# Patient Record
Sex: MALE | Race: WHITE | NOT HISPANIC OR LATINO | Employment: UNEMPLOYED | URBAN - METROPOLITAN AREA
[De-identification: names, ages, dates, MRNs, and addresses within clinical notes are randomized per-mention and may not be internally consistent; named-entity substitution may affect disease eponyms.]

---

## 2022-11-04 ENCOUNTER — HOSPITAL ENCOUNTER (EMERGENCY)
Facility: HOSPITAL | Age: 27
Discharge: HOME | End: 2022-11-04
Attending: EMERGENCY MEDICINE

## 2022-11-04 ENCOUNTER — APPOINTMENT (EMERGENCY)
Dept: CT IMAGING | Facility: HOSPITAL | Age: 27
End: 2022-11-04

## 2022-11-04 VITALS
BODY MASS INDEX: 36.54 KG/M2 | HEART RATE: 91 BPM | WEIGHT: 214 LBS | OXYGEN SATURATION: 96 % | HEIGHT: 64 IN | RESPIRATION RATE: 19 BRPM | TEMPERATURE: 98 F | DIASTOLIC BLOOD PRESSURE: 77 MMHG | SYSTOLIC BLOOD PRESSURE: 140 MMHG

## 2022-11-04 DIAGNOSIS — R51.9 RECURRENT HEADACHE: Primary | ICD-10-CM

## 2022-11-04 DIAGNOSIS — H53.8 BLURRY VISION, LEFT EYE: ICD-10-CM

## 2022-11-04 LAB
ALBUMIN SERPL BCP-MCNC: 3.8 G/DL (ref 3.5–5)
ALP SERPL-CCNC: 85 U/L (ref 46–116)
ALT SERPL W P-5'-P-CCNC: 89 U/L (ref 12–78)
ANION GAP SERPL CALCULATED.3IONS-SCNC: 6 MMOL/L (ref 4–13)
AST SERPL W P-5'-P-CCNC: 37 U/L (ref 5–45)
ATRIAL RATE: 100 BPM
BASOPHILS # BLD AUTO: 0.04 THOUSANDS/ÂΜL (ref 0–0.1)
BASOPHILS NFR BLD AUTO: 1 % (ref 0–1)
BILIRUB SERPL-MCNC: 0.2 MG/DL (ref 0.2–1)
BUN SERPL-MCNC: 20 MG/DL (ref 5–25)
CALCIUM SERPL-MCNC: 8.8 MG/DL (ref 8.3–10.1)
CARDIAC TROPONIN I PNL SERPL HS: 2 NG/L
CHLORIDE SERPL-SCNC: 106 MMOL/L (ref 96–108)
CO2 SERPL-SCNC: 28 MMOL/L (ref 21–32)
CREAT SERPL-MCNC: 1.03 MG/DL (ref 0.6–1.3)
EOSINOPHIL # BLD AUTO: 0.14 THOUSAND/ÂΜL (ref 0–0.61)
EOSINOPHIL NFR BLD AUTO: 2 % (ref 0–6)
ERYTHROCYTE [DISTWIDTH] IN BLOOD BY AUTOMATED COUNT: 11.2 % (ref 11.6–15.1)
GFR SERPL CREATININE-BSD FRML MDRD: 101 ML/MIN/1.73SQ M
GLUCOSE SERPL-MCNC: 106 MG/DL (ref 65–140)
HCT VFR BLD AUTO: 43.1 % (ref 36.5–49.3)
HGB BLD-MCNC: 14.5 G/DL (ref 12–17)
IMM GRANULOCYTES # BLD AUTO: 0.02 THOUSAND/UL (ref 0–0.2)
IMM GRANULOCYTES NFR BLD AUTO: 0 % (ref 0–2)
LYMPHOCYTES # BLD AUTO: 2.09 THOUSANDS/ÂΜL (ref 0.6–4.47)
LYMPHOCYTES NFR BLD AUTO: 33 % (ref 14–44)
MCH RBC QN AUTO: 28.4 PG (ref 26.8–34.3)
MCHC RBC AUTO-ENTMCNC: 33.6 G/DL (ref 31.4–37.4)
MCV RBC AUTO: 85 FL (ref 82–98)
MONOCYTES # BLD AUTO: 0.68 THOUSAND/ÂΜL (ref 0.17–1.22)
MONOCYTES NFR BLD AUTO: 11 % (ref 4–12)
NEUTROPHILS # BLD AUTO: 3.37 THOUSANDS/ÂΜL (ref 1.85–7.62)
NEUTS SEG NFR BLD AUTO: 53 % (ref 43–75)
NRBC BLD AUTO-RTO: 0 /100 WBCS
P AXIS: 48 DEGREES
PLATELET # BLD AUTO: 268 THOUSANDS/UL (ref 149–390)
PMV BLD AUTO: 9.2 FL (ref 8.9–12.7)
POTASSIUM SERPL-SCNC: 3.9 MMOL/L (ref 3.5–5.3)
PR INTERVAL: 166 MS
PROT SERPL-MCNC: 7.8 G/DL (ref 6.4–8.4)
QRS AXIS: 85 DEGREES
QRSD INTERVAL: 94 MS
QT INTERVAL: 338 MS
QTC INTERVAL: 436 MS
RBC # BLD AUTO: 5.1 MILLION/UL (ref 3.88–5.62)
SODIUM SERPL-SCNC: 140 MMOL/L (ref 135–147)
T WAVE AXIS: 26 DEGREES
VENTRICULAR RATE: 100 BPM
WBC # BLD AUTO: 6.34 THOUSAND/UL (ref 4.31–10.16)

## 2022-11-04 RX ORDER — KETOROLAC TROMETHAMINE 30 MG/ML
15 INJECTION, SOLUTION INTRAMUSCULAR; INTRAVENOUS ONCE
Status: COMPLETED | OUTPATIENT
Start: 2022-11-04 | End: 2022-11-04

## 2022-11-04 RX ORDER — DIPHENHYDRAMINE HYDROCHLORIDE 50 MG/ML
25 INJECTION INTRAMUSCULAR; INTRAVENOUS ONCE
Status: COMPLETED | OUTPATIENT
Start: 2022-11-04 | End: 2022-11-04

## 2022-11-04 RX ORDER — TETRACAINE HYDROCHLORIDE 5 MG/ML
1 SOLUTION OPHTHALMIC ONCE
Status: COMPLETED | OUTPATIENT
Start: 2022-11-04 | End: 2022-11-04

## 2022-11-04 RX ORDER — MAGNESIUM SULFATE HEPTAHYDRATE 40 MG/ML
2 INJECTION, SOLUTION INTRAVENOUS ONCE
Status: COMPLETED | OUTPATIENT
Start: 2022-11-04 | End: 2022-11-04

## 2022-11-04 RX ADMIN — MAGNESIUM SULFATE HEPTAHYDRATE 2 G: 2 INJECTION, SOLUTION INTRAVENOUS at 17:05

## 2022-11-04 RX ADMIN — DIPHENHYDRAMINE HYDROCHLORIDE 25 MG: 50 INJECTION, SOLUTION INTRAMUSCULAR; INTRAVENOUS at 16:55

## 2022-11-04 RX ADMIN — SODIUM CHLORIDE 1000 ML: 0.9 INJECTION, SOLUTION INTRAVENOUS at 16:54

## 2022-11-04 RX ADMIN — KETOROLAC TROMETHAMINE 15 MG: 30 INJECTION, SOLUTION INTRAMUSCULAR; INTRAVENOUS at 16:56

## 2022-11-04 RX ADMIN — IOHEXOL 100 ML: 350 INJECTION, SOLUTION INTRAVENOUS at 18:39

## 2022-11-04 RX ADMIN — TETRACAINE HYDROCHLORIDE 1 DROP: 5 SOLUTION OPHTHALMIC at 19:52

## 2022-11-04 NOTE — ED PROVIDER NOTES
History  Chief Complaint   Patient presents with   • Headache     Pt reports a headache that he has been experiencing for a couple years now  States he feels like his forehead is swollen, and thinks he has "like high blood pressure too"      27year old male presents for evaluation of daily headaches for the past 2 years  Patient states the headaches are pressure-like over the left eye, typically lasting 1 hour at a time  Patient states he began having a headache at 1 pm which was associated with blurring of the vision from his left eye and chest tightness  Symptoms lasted approximately 1 hour and then resolved without intervention  He states his blood pressure was elevated to 186/98  Patient states the headache returned 30 minutes ago, moderate in intensity  No chest pain or shortness of breath with this episode  No recent illness  Patient has history of IVDU in the past, but states he has not used any drugs in the past 6 months  History provided by:  Patient  Headache  Pain location:  Frontal  Radiates to:  Does not radiate  Pain severity now: moderate  Onset quality:  Gradual  Duration: current episode 30 minutes, recurrent for 2 years  Timing:  Intermittent  Chronicity:  Recurrent  Similar to prior headaches: yes    Relieved by:  None tried  Worsened by:  Nothing  Ineffective treatments:  None tried  Associated symptoms: no congestion, no cough, no diarrhea, no eye pain, no fever, no nausea, no seizures, no sore throat and no vomiting        None       History reviewed  No pertinent past medical history  History reviewed  No pertinent surgical history  History reviewed  No pertinent family history  I have reviewed and agree with the history as documented      E-Cigarette/Vaping     E-Cigarette/Vaping Substances     Social History     Tobacco Use   • Smoking status: Never Smoker   • Smokeless tobacco: Never Used   Substance Use Topics   • Alcohol use: Never   • Drug use: Never       Review of Systems Constitutional: Negative for chills and fever  HENT: Negative for congestion and sore throat  Eyes: Positive for visual disturbance  Negative for pain  Respiratory: Positive for chest tightness  Negative for cough and shortness of breath  Cardiovascular: Positive for chest pain  Gastrointestinal: Negative for diarrhea, nausea and vomiting  Skin: Negative for rash and wound  Neurological: Positive for headaches  Negative for seizures and syncope  All other systems reviewed and are negative  Physical Exam  Physical Exam  Vitals and nursing note reviewed  Constitutional:       General: He is not in acute distress  Appearance: He is well-developed  He is not toxic-appearing or diaphoretic  HENT:      Head: Normocephalic and atraumatic  Right Ear: External ear normal       Left Ear: External ear normal       Nose: Nose normal    Eyes:      General: No scleral icterus  Intraocular pressure: Right eye pressure is 12 mmHg  Left eye pressure is 12 mmHg  Measurements were taken using a handheld tonometer  Extraocular Movements: Extraocular movements intact  Pupils: Pupils are equal, round, and reactive to light  Cardiovascular:      Rate and Rhythm: Normal rate and regular rhythm  Heart sounds: Normal heart sounds  Pulmonary:      Effort: Pulmonary effort is normal  No respiratory distress  Breath sounds: Normal breath sounds  Abdominal:      General: There is no distension  Musculoskeletal:         General: No deformity  Normal range of motion  Skin:     Findings: No rash  Neurological:      General: No focal deficit present  Mental Status: He is alert and oriented to person, place, and time  Cranial Nerves: Cranial nerves are intact  Sensory: Sensation is intact  Motor: Motor function is intact  Coordination: Coordination is intact   Finger-Nose-Finger Test and Heel to [facility] [[email or web address]] [place] Test normal       Gait: Gait normal    Psychiatric: Mood and Affect: Mood normal          Vital Signs  ED Triage Vitals   Temperature Pulse Respirations Blood Pressure SpO2   11/04/22 1552 11/04/22 1552 11/04/22 1552 11/04/22 1554 11/04/22 1552   98 °F (36 7 °C) 103 20 154/92 98 %      Temp Source Heart Rate Source Patient Position - Orthostatic VS BP Location FiO2 (%)   11/04/22 1552 11/04/22 1552 -- -- --   Temporal Monitor         Pain Score       11/04/22 1656       7           Vitals:    11/04/22 1552 11/04/22 1554 11/04/22 1800   BP:  154/92 122/64   Pulse: 103  91         Visual Acuity  Visual Acuity    Flowsheet Row Most Recent Value   Visual acuity R eye is 20/20   Visual acuity Left eye is 20/50   Visual acuity in both eyes is 20/25   Wearing corrective eyewear/lenses?  No          ED Medications  Medications   magnesium sulfate 2 g/50 mL IVPB (premix) 2 g (0 g Intravenous Stopped 11/4/22 1950)   ketorolac (TORADOL) injection 15 mg (15 mg Intravenous Given 11/4/22 1656)   diphenhydrAMINE (BENADRYL) injection 25 mg (25 mg Intravenous Given 11/4/22 1655)   sodium chloride 0 9 % bolus 1,000 mL (0 mL Intravenous Stopped 11/4/22 1950)   iohexol (OMNIPAQUE) 350 MG/ML injection (SINGLE-DOSE) 100 mL (100 mL Intravenous Given 11/4/22 1839)   tetracaine 0 5 % ophthalmic solution 1 drop (1 drop Both Eyes Given 11/4/22 1952)       Diagnostic Studies  Results Reviewed     Procedure Component Value Units Date/Time    HS Troponin 0hr (reflex protocol) [[id number]]  (Normal) Collected: 11/04/22 1701    Lab Status: Final result Specimen: Blood from Arm, Right Updated: 11/04/22 1731     hs TnI 0hr 2 ng/L     Comprehensive metabolic panel [[id number]]  (Abnormal) Collected: 11/04/22 1701    Lab Status: Final result Specimen: Blood from Arm, Right Updated: 11/04/22 1725     Sodium 140 mmol/L      Potassium 3 9 mmol/L      Chloride 106 mmol/L      CO2 28 mmol/L      ANION GAP 6 mmol/L      BUN 20 mg/dL      Creatinine 1 03 mg/dL      Glucose 106 mg/dL      Calcium 8 8 mg/dL AST 37 U/L      ALT 89 U/L      Alkaline Phosphatase 85 U/L      Total Protein 7 8 g/dL      Albumin 3 8 g/dL      Total Bilirubin 0 20 mg/dL      eGFR 101 ml/min/1 73sq m     Narrative:      [place] guidelines for Chronic Kidney Disease (CKD):   •  Stage 1 with normal or high GFR (GFR > 90 mL/min/1 73 square meters)  •  Stage 2 Mild CKD (GFR = 60-89 mL/min/1 73 square meters)  •  Stage 3A Moderate CKD (GFR = 45-59 mL/min/1 73 square meters)  •  Stage 3B Moderate CKD (GFR = 30-44 mL/min/1 73 square meters)  •  Stage 4 Severe CKD (GFR = 15-29 mL/min/1 73 square meters)  •  Stage 5 End Stage CKD (GFR <15 mL/min/1 73 square meters)  Note: GFR calculation is accurate only with a steady state creatinine    CBC and differential [[id number]]  (Abnormal) Collected: 11/04/22 1701    Lab Status: Final result Specimen: Blood from Arm, Right Updated: 11/04/22 1710     WBC 6 34 Thousand/uL      RBC 5 10 Million/uL      Hemoglobin 14 5 g/dL      Hematocrit 43 1 %      MCV 85 fL      MCH 28 4 pg      MCHC 33 6 g/dL      RDW 11 2 %      MPV 9 2 fL      Platelets 209 Thousands/uL      nRBC 0 /100 WBCs      Neutrophils Relative 53 %      Immat GRANS % 0 %      Lymphocytes Relative 33 %      Monocytes Relative 11 %      Eosinophils Relative 2 %      Basophils Relative 1 %      Neutrophils Absolute 3 37 Thousands/µL      Immature Grans Absolute 0 02 Thousand/uL      Lymphocytes Absolute 2 09 Thousands/µL      Monocytes Absolute 0 68 Thousand/µL      Eosinophils Absolute 0 14 Thousand/µL      Basophils Absolute 0 04 Thousands/µL                  CTA head and neck with and without contrast   Final Result by Blessing eLzama MD (11/04 1916)         1  No evidence of acute intracranial hemorrhage  2  No evidence of hemodynamic significant stenosis, aneurysm or dissection                    Workstation performed: [id number]                    Procedures  ECG 12 Lead Documentation Only    Date/Time: 11/4/2022 5:20 PM  Performed by: Leona Kay MD  Authorized by: Leona Kay MD     Indications / Diagnosis:  Headache  ECG reviewed by me, the ED Provider: yes    Patient location:  ED  Previous ECG:     Previous ECG:  Unavailable  Interpretation:     Interpretation: normal    Rate:     ECG rate:  100    ECG rate assessment: tachycardic    Rhythm:     Rhythm: sinus tachycardia    Ectopy:     Ectopy: none    QRS:     QRS axis:  Normal    QRS intervals:  Normal  Conduction:     Conduction: normal    ST segments:     ST segments:  Normal  T waves:     T waves: inverted      Inverted:  III    POC Ocular US    Date/Time: 11/4/2022 8:00 PM  Performed by: Leona Kay MD  Authorized by: Leona Kay MD     Patient location:  ED  Performed by: Attending  Procedure details:     Exam Type:  Diagnostic    Indications: visual change      Assessment for: retinal detachment and optic nerve sheath diameter      Eye(s) assessed:  Left eye    Structures visualized: anterior chamber, posterior chamber, lens and optic nerve      Image quality: limited diagnostic      Image availability:  Images available in PACS  Left eye findings:     Retinal contour: normal      Vitreous body: anechoic      Left optic nerve sheath diameter: normal (less than or equal to 5 mm)      Left optic nerve sheath diameter (mm):  4  Interpretation:     Ocular US impressions: normal               ED Course                               SBIRT 20yo+    Flowsheet Row Most Recent Value   SBIRT (23 yo +)    In order to provide better care to our patients, we are screening all of our patients for alcohol and drug use  Would it be okay to ask you these screening questions?  No Filed at: 11/04/2022 1652                    MDM  Number of Diagnoses or Management Options  Blurry vision, left eye: new and requires workup  Recurrent headache: new and requires workup  Diagnosis management comments: 27year old male presents for evaluation of recurrent headache for 2 years  No prior workup per patient  Labs unremarkable  CTA head/neck unremarkable  Normal occular pressure  Retina unremarkable on bedside ultrasound  Symptoms improved with migraine cocktail  Neuro and ophthalmology follow up  Return precautions provided  Amount and/or Complexity of Data Reviewed  Clinical lab tests: ordered and reviewed  Tests in the radiology section of CPT®: ordered and reviewed    Patient Progress  Patient progress: stable      Disposition  Final diagnoses:   Blurry vision, left eye   Recurrent headache     Time reflects when diagnosis was documented in both MDM as applicable and the Disposition within this note     Time User Action Codes Description Comment    11/4/2022  7:59 PM Marnee Elm Add [H53 8] Blurry vision, left eye     11/4/2022  8:00 PM Marnee Elm Add [R51 9] Acute headache     11/4/2022  8:00 PM Hope, Clovia Durie Add [R51 9] Recurrent headache     11/4/2022  8:00 PM Hope, Clovia Durie Remove [R51 9] Acute headache     11/4/2022  8:00 PM Marnee Elm Modify [H53 8] Blurry vision, left eye     11/4/2022  8:00 PM Marnee Elm Modify [R51 9] Recurrent headache       ED Disposition     ED Disposition   Discharge    Condition   Stable    Date/Time   Fri Nov 4, 2022  8:00 PM    Comment   Francisco Renner discharge to home/self care                 Follow-up Information     Follow up With Specialties Details Why Contact Info Additional [facility] Schedule an appointment as soon as possible for a visit in 3 days for re-evaluation [address] [facility] [address] [phone]  [address] [facility] [address], [address], [address], [place], [place], [address] [facility]    La Garcia MD Ophthalmology Schedule an appointment as soon as possible for a visit in 3 days for re-evaluation [facility]  [phone]        [address] Emergency Department Emergency Medicine Go to  If symptoms worsen, numbness, weakness, difficulty speaking, complete loss of vision in left eye, severe headache [address] [facility] Emergency Department, [address] [place] [address], [facility] 10          Patient's Medications    No medications on file           PDMP Review     None          ED Provider  Electronically Signed by           Yoselin Price MD  11/04/22 2005

## 2022-12-30 ENCOUNTER — TELEPHONE (OUTPATIENT)
Dept: NEUROLOGY | Facility: CLINIC | Age: 27
End: 2022-12-30

## 2023-02-28 ENCOUNTER — TELEPHONE (OUTPATIENT)
Dept: NEUROLOGY | Facility: CLINIC | Age: 28
End: 2023-02-28

## 2023-02-28 NOTE — TELEPHONE ENCOUNTER
[facility] offering pt earlier appt on 3/3 in [address] [place] [address] with Messi at either 10am or 11am  I informed pt that these appts may no longer be available when they call back

## 2023-03-01 ENCOUNTER — TELEPHONE (OUTPATIENT)
Dept: NEUROLOGY | Facility: CLINIC | Age: 28
End: 2023-03-01

## 2024-11-27 ENCOUNTER — HOSPITAL ENCOUNTER (OUTPATIENT)
Dept: RADIOLOGY | Facility: HOSPITAL | Age: 29
Discharge: HOME | End: 2024-11-27
Payer: COMMERCIAL

## 2024-11-27 DIAGNOSIS — S29.011A RUPTURE OF PECTORALIS MAJOR MUSCLE, INITIAL ENCOUNTER: ICD-10-CM

## 2024-11-27 PROCEDURE — 71046 X-RAY EXAM CHEST 2 VIEWS: CPT
